# Patient Record
Sex: MALE | Race: WHITE
[De-identification: names, ages, dates, MRNs, and addresses within clinical notes are randomized per-mention and may not be internally consistent; named-entity substitution may affect disease eponyms.]

---

## 2020-02-16 ENCOUNTER — HOSPITAL ENCOUNTER (EMERGENCY)
Dept: HOSPITAL 62 - ER | Age: 40
Discharge: HOME | End: 2020-02-16
Payer: SELF-PAY

## 2020-02-16 VITALS — SYSTOLIC BLOOD PRESSURE: 136 MMHG | DIASTOLIC BLOOD PRESSURE: 86 MMHG

## 2020-02-16 DIAGNOSIS — F17.200: ICD-10-CM

## 2020-02-16 DIAGNOSIS — M94.0: Primary | ICD-10-CM

## 2020-02-16 DIAGNOSIS — R07.89: ICD-10-CM

## 2020-02-16 PROCEDURE — 99283 EMERGENCY DEPT VISIT LOW MDM: CPT

## 2020-02-16 NOTE — ER DOCUMENT REPORT
ED General





- General


Chief Complaint: Skin Problem


Stated Complaint: LUMP ON CHEST


Notes: 





Patient is a 39-year-old white male with no significant past medical history 

presents to the emergency department with a chief complaint of soreness to the 

left lower chest wall and anterior chest at the xiphoid region for the past 3 to

4 days.  Patient reports no injury or fall.  States he has some soreness to the 

left lower anterior chest wall that extends to the xiphoid area with some what 

he perceives as palpable swelling.  He also states that he is lost about 50 

pounds over the past 3 months utilizing an exercise program.  States he is 

unsure if this is just normal anatomy that was beneath his skin surface 

previously with the extra weight but he does admit that it is sore.  He denies 

any redness or increased warmth or drainage.  No fevers, chills or night sweats,

change in appetite or shortness of breath.


TRAVEL OUTSIDE OF THE [place] IN LAST 30 DAYS: No





- Related Data


Allergies/Adverse Reactions: 


                                        





No Known Allergies Allergy (Unverified 11/03/19 17:50)


   











Past Medical History





- Social History


Smoking Status: Current Every Day Smoker


Family History: Reviewed & Not Pertinent


Patient has suicidal ideation: No


Patient has homicidal ideation: No





Review of Systems





- Review of Systems


Musculoskeletal: Other - Chest wall pain


-: Yes All other systems reviewed and negative





Physical Exam





- Vital signs


Vitals: 





                                        











Temp Pulse Resp BP Pulse Ox


 


 97.6 F   64   16   136/86 H  100 


 


 02/16/20 22:26  02/16/20 22:26  02/16/20 22:26  02/16/20 22:26  02/16/20 22:26














- General


General appearance: Appears well, Alert


In distress: None





- Respiratory


Respiratory status: No respiratory distress


Chest status: Tender, Other - Tender to palpation left lower anterior chest wall

from the midclavicular line to the central sternal line.  There is also some 

tenderness over the xiphoid process.  No redness or increased warmth.


Breath sounds: Normal


Chest palpation: Normal, Tender.  No: Flail segment, Pink frothy sputum, 

Purulent sputum, Subcutaneous emphysema, Sucking chest wound, Ecchymosis, Wounds





- Cardiovascular


Rhythm: Regular


Heart sounds: Normal auscultation


Murmur: No





- Neurological


Neuro grossly intact: Yes


Cognition: Normal


Orientation: AAOx4


Guy Coma Scale Eye Opening: Spontaneous


Chrisman Coma Scale Verbal: Oriented


Chrisman Coma Scale Motor: Obeys Commands


Guy Coma Scale Total: 15


Speech: Normal





- Psychological


Associated symptoms: Normal affect, Normal mood





- Skin


Skin Temperature: Warm


Skin Moisture: Dry


Skin Color: Normal





Course





- Re-evaluation


Re-evalutation: 





02/16/20 22:47


Patient has close outpatient management and follow-up.  He was continue to 

follow-up closely outpatient for resolution and further outpatient management of

this problem.  Counseled him at length regarding the importance of outpatient 

follow-up and advised that he return here or any ER immediately with any new, 

persistent or worsening symptoms.  He verbalized understood and agreed.





- Vital Signs


Vital signs: 





                                        











Temp Pulse Resp BP Pulse Ox


 


 97.6 F   64   16   136/86 H  100 


 


 02/16/20 22:26  02/16/20 22:26  02/16/20 22:26  02/16/20 22:26  02/16/20 22:26














Discharge





- Discharge


Clinical Impression: 


 Costochondritis





Condition: Stable


Disposition: HOME, SELF-CARE


Instructions:  Costochondritis ([facility])


Additional Instructions: 


Follow-up with your regular doctor in 2 to 3 days for reevaluation.  Return here

or any ER immediately with any new, persistent or worsening symptoms.  You have 

been referred to orthopedics, please call them for appointment.


Prescriptions: 


Meloxicam [Mobic] 7.5 mg PO DAILY #30 tablet


Referrals: 


RODRÍGUEZ CASTRO DO [ACTIVE STAFF] - Follow up as needed